# Patient Record
Sex: MALE | Race: ASIAN
[De-identification: names, ages, dates, MRNs, and addresses within clinical notes are randomized per-mention and may not be internally consistent; named-entity substitution may affect disease eponyms.]

---

## 2021-01-07 ENCOUNTER — HOSPITAL ENCOUNTER (OUTPATIENT)
Dept: HOSPITAL 92 - LABBT | Age: 51
Discharge: HOME | End: 2021-01-07
Attending: ORTHOPAEDIC SURGERY
Payer: SELF-PAY

## 2021-01-07 DIAGNOSIS — Z20.822: ICD-10-CM

## 2021-01-07 DIAGNOSIS — S93.324A: ICD-10-CM

## 2021-01-07 DIAGNOSIS — Z01.812: Primary | ICD-10-CM

## 2021-01-07 PROCEDURE — U0003 INFECTIOUS AGENT DETECTION BY NUCLEIC ACID (DNA OR RNA); SEVERE ACUTE RESPIRATORY SYNDROME CORONAVIRUS 2 (SARS-COV-2) (CORONAVIRUS DISEASE [COVID-19]), AMPLIFIED PROBE TECHNIQUE, MAKING USE OF HIGH THROUGHPUT TECHNOLOGIES AS DESCRIBED BY CMS-2020-01-R: HCPCS

## 2021-01-07 PROCEDURE — 87635 SARS-COV-2 COVID-19 AMP PRB: CPT

## 2021-01-12 ENCOUNTER — HOSPITAL ENCOUNTER (OUTPATIENT)
Dept: HOSPITAL 92 - SDC | Age: 51
Discharge: HOME | End: 2021-01-12
Attending: ORTHOPAEDIC SURGERY
Payer: COMMERCIAL

## 2021-01-12 VITALS — BODY MASS INDEX: 26.6 KG/M2

## 2021-01-12 DIAGNOSIS — G89.18: ICD-10-CM
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
## 2021-01-12 DIAGNOSIS — S93.324A: Primary | ICD-10-CM

## 2021-01-12 PROCEDURE — A4306 DRUG DELIVERY SYSTEM <=50 ML: HCPCS

## 2021-01-12 PROCEDURE — C1713 ANCHOR/SCREW BN/BN,TIS/BN: HCPCS

## 2021-01-12 PROCEDURE — 3E0T3BZ INTRODUCTION OF ANESTHETIC AGENT INTO PERIPHERAL NERVES AND PLEXI, PERCUTANEOUS APPROACH: ICD-10-PCS | Performed by: ORTHOPAEDIC SURGERY

## 2021-01-12 PROCEDURE — 0QSN04Z REPOSITION RIGHT METATARSAL WITH INTERNAL FIXATION DEVICE, OPEN APPROACH: ICD-10-PCS | Performed by: ORTHOPAEDIC SURGERY

## 2021-01-12 PROCEDURE — 76000 FLUOROSCOPY <1 HR PHYS/QHP: CPT

## 2021-01-12 NOTE — RAD
Right foot 2 views intraoperative fluoroscopy



HISTORY: Lisfranc injury.



FINDINGS: Intraoperative fluoroscopy was provided for internal fixation as performed by Dr. Landry
. Spot fluoroscopic images show multiple screws transfixing the first through third tarsometatarsal

joints and a long wire transfixing the fifth tarsometatarsal joint. Alignment is anatomic.



Reported By: SAMANTHA Florez 

Electronically Signed:  1/12/2021 4:01 PM

## 2021-01-12 NOTE — OP
DATE OF PROCEDURE:  01/12/2021



PROCEDURE PERFORMED:  Open reduction and internal fixation of right midfoot fracture

dislocation with Lisfranc dislocation. 



PREOPERATIVE DIAGNOSIS:  Right midfoot fracture dislocation including the Lisfranc

ligament. 



POSTOPERATIVE DIAGNOSIS:  Right midfoot fracture dislocation including the Lisfranc

ligament. 



COMPLICATIONS:  None.



ESTIMATED BLOOD LOSS:  Minimal.



IMPLANTS:  4 Synthes 3.5 mm screws were utilized.



INDICATIONS:  Mr. Holliday is a 50-year-old male who was injured, moving furniture.  He

fractured his mid foot and dislocated his Lisfranc joint and the 3rd metatarsal

base.  He has been indicated for open reduction and internal fixation to restore

anatomic alignment and promote healing.  Risks have been reviewed in detail.  He has

elected to proceed with the operation.  Risks to include chronic pain, posttraumatic

changes, arthritis, nerve or vascular injury, and others. 



DESCRIPTION OF PROCEDURE:  Mr. Holliday was identified in the preoperative holding area.

 His correct extremity was marked.  He was carried to the operating room.  He was

positioned supine.  General anesthesia was induced.  A multidisciplinary time-out

was performed.  The right lower extremity was prepped and draped in sterile fashion. 



We began the procedure with a dorsal approach to the foot.  We dissected down

through subcutaneous tissues to the fascia overlying the bony structures.  We

protected the neurovascular structures.  We then identified the 1st metatarsal, 2nd

metatarsal, and 3rd metatarsal.  These were all displaced laterally.  The 3rd

metatarsal was dislocated.  At this point, we reduced the 1st metatarsal to the

middle cuneiform.  This was held with a K-wire.  We then passed a 3.5-mm screw

across the joint stabilizing this articulation.  Next, we reduced the 2nd metatarsal

to the middle cuneiform.  A screw was placed from the medial foot across the

Lisfranc joint into the 2nd metatarsal base.  A 2nd screw was placed from the 2nd

metatarsal base to the middle cuneiform.  Finally, we reduced the 3rd metatarsal and

passed a screw across the 3rd metatarsal articulation.  Next, we placed a K-wire in

the 5th metatarsal, stabilized the lateral column of the foot.  We took final x-ray

images in all planes.  We thoroughly irrigated with copious lavage.  We were happy

with hardware and reduction.  We closed in layers.  A sterile dressing and a splint

were placed.  The patient was taken to the recovery room in good condition. 







Job ID:  [id number]